# Patient Record
Sex: FEMALE | Race: WHITE
[De-identification: names, ages, dates, MRNs, and addresses within clinical notes are randomized per-mention and may not be internally consistent; named-entity substitution may affect disease eponyms.]

---

## 2019-07-29 ENCOUNTER — HOSPITAL ENCOUNTER (INPATIENT)
Dept: HOSPITAL 7 - FB.OBCHECK | Age: 39
LOS: 1 days | Discharge: HOME | End: 2019-07-30
Attending: FAMILY MEDICINE | Admitting: FAMILY MEDICINE
Payer: COMMERCIAL

## 2019-07-29 DIAGNOSIS — Z79.890: ICD-10-CM

## 2019-07-29 DIAGNOSIS — Z3A.34: ICD-10-CM

## 2019-07-29 DIAGNOSIS — E03.9: ICD-10-CM

## 2019-07-29 NOTE — PCM.LDHP
L&D History of Present Illness





- General


Date of Service: 19


Admit Problem/Dx: 


 Patient Status Order with Admit Dx/Problem





19 21:11


Patient Status [ADT] Routine 








 Admission Diagnosis/Problem











Admission Diagnosis/Problem    Labor established














Source of Information: Patient


History Limitations: Reports: No Limitations





- History of Present Illness


Introduction:: 





spontaneous onset of labor at  this pm accompanied by vaginal bleeding.


Mom 39yo  healthy multip.


Timing/Duration: Reports: minutes: (3)


Location, Pregnancy: Reports: Uterus


Quality: Reports: Ache


Severity: Moderate





- Related Data


Allergies/Adverse Reactions: 


 Allergies











Allergy/AdvReac Type Severity Reaction Status Date / Time


 


No Known Allergies Allergy   Verified 19 11:00














Past Medical History


Cardiovascular History: Reports: Other (See Below)


Other Cardiovascular History: pedsl edema    better in am


OB/GYN History: Reports: Pregnancy


Other OB/BYN History: 


Endocrine/Metabolic History: Reports: Hypothyroidism


Other Hematologic History: spleenectomy in 





Social & Family History





- Family History


Family Medical History: Noncontributory





- Caffeine Use


Caffeine Use: Reports: Coffee





H&P Review of Systems





- Review of Systems:


Review Of Systems: See Below


General: Denies: Fever, Chills


HEENT: Reports: No Symptoms


Pulmonary: Denies: Shortness of Breath


Cardiovascular: Denies: Chest Pain, Edema


Gastrointestinal: Denies: Diarrhea


Genitourinary: Denies: Burning, Hematuria


Musculoskeletal: Reports: No Symptoms


Skin: Reports: No Symptoms


Psychiatric: Reports: Anxiety


Neurological: Reports: No Symptoms





L&D Exam





- Exam


Exam: See Below





- OB Specific


Contraction Duration (sec): 90


Contraction Frequency (min): 3


Contraction Intensity: Moderate to Strong


Fetal Movement: Not Appreciated


Fetal Heart Tones: Present


Fetal Heart Rate (FHR) Variability: Moderate (6-25 bmp)


Birth Presentation: Vertex





- Valera Score


Valera Score Cervix Position: Anterior


Valera Score Effacement: >80%


Valera Score Dilation: > 5 cm


Valera Score Infant's Station: +1, +2





- Exam


General: Alert, Oriented


Neck: Supple


Lungs: Clear to Auscultation


Cardiovascular: Regular Rate, Regular Rhythm


GI/Abdominal Exam: Soft, Non-Tender


Genitourinary: Cervical dilitation (7cm)


Extremities: No: Pedal Edema


Skin: No: Rash


Psychiatric: Anxious





- Problem List


(1)  delivery


SNOMED Code(s): 042280768, 464720641


   ICD Code: O60.10X0 -  LABOR W  DELIVERY, UNSP TRIMESTER, UNSP 

  Status: Acute   Current Visit: Yes   


Problem List Initiated/Reviewed/Updated: Yes


Orders Last 24hrs: 


 Active Orders 24 hr











 Category Date Time Status


 


 Patient Status [ADT] Routine ADT  19 21:11 Active


 


 Communication Order [RC] ASDIRECTED Care  19 21:11 Active


 


 Fetal Heart Tones [RC] PER UNIT ROUTINE Care  19 21:11 Active


 


 Fetal Non Stress Test [RC] Click to Edit Care  19 21:11 Active


 


 Notify Provider Vital Signs [RC] PRN Care  19 21:08 Active


 


 Notify Provider [RC] PRN Care  19 21:11 Active


 


 Vital Signs [RC] PER UNIT ROUTINE Care  19 21:11 Active


 


 Lactated Ringers [Ringers, Lactated] 1,000 ml Med  19 21:30 Active





 IV ASDIRECTED   


 


 Sodium Chloride 0.9% [Saline Flush] Med  19 21:08 Active





 10 ml FLUSH ASDIRECTED PRN   


 


 Saline Lock Insert [OM.PC] Routine Oth  19 21:11 Ordered


 


 Resuscitation Status Routine Resus Stat  19 21:08 Ordered








 Medication Orders





Lactated Ringer's (Ringers, Lactated)  1,000 mls @ 150 mls/hr IV ASDIRECTED GISELE


Sodium Chloride (Saline Flush)  10 ml FLUSH ASDIRECTED PRN


   PRN Reason: Keep Vein Open

## 2019-07-30 NOTE — DISCH
DISCHARGE DATE:  2019

 

PRIMARY FINAL DIAGNOSIS:   labor with  delivery.

 

OPERATION:  Normal vaginal delivery.

 

COMPLICATIONS:  None.

 

SUMMARY:  Marjorie is a 38-year-old,  4, para 3 woman who came in at 32

weeks and 5 days gestation with spontaneous onset of labor and vaginal bleeding.

On arrival, she was 4 cm.  She progressed rapidly to 7 cm and then to complete

precipitous delivery.

 

She delivered a 4 pounds 15 ounce male infant in the labor bed.  Baby required

resuscitation and was transported to the NICU in [place] per NICU Team in stable

condition.

 

Mom's postpartum course was normal with a postpartum hemoglobin still pending.

 

She is discharged to home to continue medications as follows:  Prenatal vitamins

1 daily, vitamin C 500 mg daily, and levothyroxine 200 mcg daily.

 

She is to have follow up with Dr. Fontanez in 6 weeks and come in with her baby

when he returns for a  check.

 

Job#: [id number]

DD: 2019 [id number]

DT: 2019 1935 KATI/BRIAN

## 2019-07-30 NOTE — DEL
DATE OF DELIVERY:  2019

 

Ms. Brewster is a 38-year-old,  4, para 3 woman at 34 weeks gestation, who

came in with spontaneous labor.  On admission, she was having contractions with

bleeding.  She was dilated to 4 cm.  She rapidly progressed to complete and had

a precipitous delivery of a male infant.  Baby was delivered onto the bed with

the nurse present.  I was there within 30 seconds of delivery.  Baby initially

had no respiratory effort, had heart rate of approximately 80, and mild mottling

of the extremities with decreased tone obvious.  Nares were suctioned and the

baby was given several breaths of mouth to face respirations.  Bag-mask was then

acquired and baby bagged with room air, it was then hooked up to oxygen and baby

brought to the [place] warmer and was continued additional suctioning and

aspiration of stomach contents was accomplished.  Bagging continued.  Baby

pinked up nicely.  Heart rate went up into the 130s after bagging and color

improved.  Baby was dried and taken to the nursery, placed on a fresh [place]

warmer.  In the meantime, mom had been attended to, she delivered spontaneously,

delivered Schultze placenta with a three-vessel cord.  She was given two

aliquots of 10 mg Pitocin IM.  With additional fundal massage, her uterus

contracted down to nice firm uterus.  Perineal inspection revealed no apparent

lacerations, and her IV was run at 999 mL per hour.  Baby will be sent to [facility]

via the  transport team and mom will likely be discharged in the morning.

Baby's birth weight 4 pounds 15.5 ounces.  Apgar score of 4, 7, and 7.

 

Job#: [id number]

DD: 2019 [id number]

DT: 2019 1455 KATI/BRIAN

## 2023-04-06 ENCOUNTER — HOSPITAL ENCOUNTER (INPATIENT)
Dept: HOSPITAL 7 - FB.ED | Age: 43
LOS: 9 days | Discharge: HOME | DRG: 361 | End: 2023-04-15
Attending: FAMILY MEDICINE | Admitting: EMERGENCY MEDICINE
Payer: COMMERCIAL

## 2023-04-06 DIAGNOSIS — Z79.890: ICD-10-CM

## 2023-04-06 DIAGNOSIS — L03.115: Primary | ICD-10-CM

## 2023-04-06 DIAGNOSIS — Z79.899: ICD-10-CM

## 2023-04-06 DIAGNOSIS — D50.9: ICD-10-CM

## 2023-04-06 DIAGNOSIS — E87.6: ICD-10-CM

## 2023-04-06 DIAGNOSIS — E03.9: ICD-10-CM

## 2023-04-06 DIAGNOSIS — Z90.81: ICD-10-CM

## 2023-04-06 DIAGNOSIS — Z90.49: ICD-10-CM

## 2023-04-06 RX ADMIN — Medication PRN ML: at 17:55

## 2023-04-06 RX ADMIN — Medication PRN ML: at 18:15

## 2023-04-06 RX ADMIN — KETOROLAC TROMETHAMINE ONE: 30 INJECTION, SOLUTION INTRAMUSCULAR at 18:32

## 2023-04-06 RX ADMIN — KETOROLAC TROMETHAMINE ONE MG: 30 INJECTION, SOLUTION INTRAMUSCULAR at 18:14

## 2023-04-07 RX ADMIN — Medication PRN ML: at 06:45

## 2023-04-07 RX ADMIN — Medication PRN ML: at 00:35

## 2023-04-08 LAB
IRON SATN MFR SERPL: 2 % (ref 15–55)
IRON, SERUM: 5 UG/DL (ref 27–159)
TIBC SERPL-MCNC: 309 UG/DL (ref 250–450)
UIBC SERPL-MCNC: 304 UG/DL (ref 131–425)

## 2023-04-08 RX ADMIN — Medication PRN ML: at 13:14

## 2023-04-08 RX ADMIN — Medication PRN ML: at 11:38

## 2023-04-08 RX ADMIN — Medication PRN ML: at 20:58

## 2023-04-08 RX ADMIN — Medication PRN ML: at 13:16

## 2023-04-09 RX ADMIN — Medication SCH EACH: at 11:37

## 2023-04-09 RX ADMIN — Medication PRN ML: at 21:03

## 2023-04-09 RX ADMIN — Medication PRN ML: at 05:01

## 2023-04-09 RX ADMIN — Medication PRN ML: at 11:42

## 2023-04-10 RX ADMIN — Medication PRN ML: at 22:43

## 2023-04-10 RX ADMIN — Medication SCH MG: at 12:23

## 2023-04-10 RX ADMIN — Medication PRN ML: at 22:49

## 2023-04-10 RX ADMIN — Medication PRN ML: at 04:36

## 2023-04-10 RX ADMIN — Medication PRN ML: at 12:31

## 2023-04-10 RX ADMIN — Medication SCH EACH: at 11:49

## 2023-04-10 RX ADMIN — Medication SCH MG: at 20:17

## 2023-04-11 PROCEDURE — 0HBKXZZ EXCISION OF RIGHT LOWER LEG SKIN, EXTERNAL APPROACH: ICD-10-PCS

## 2023-04-11 RX ADMIN — Medication SCH MG: at 20:36

## 2023-04-11 RX ADMIN — Medication SCH MG: at 09:09

## 2023-04-11 RX ADMIN — Medication PRN ML: at 12:53

## 2023-04-11 RX ADMIN — Medication PRN ML: at 22:45

## 2023-04-11 RX ADMIN — Medication PRN ML: at 11:05

## 2023-04-11 RX ADMIN — Medication PRN ML: at 20:32

## 2023-04-11 RX ADMIN — Medication PRN ML: at 04:04

## 2023-04-11 RX ADMIN — Medication SCH EACH: at 11:30

## 2023-04-12 RX ADMIN — Medication PRN ML: at 11:19

## 2023-04-12 RX ADMIN — Medication PRN ML: at 11:15

## 2023-04-12 RX ADMIN — Medication SCH EACH: at 11:13

## 2023-04-12 RX ADMIN — Medication PRN ML: at 22:14

## 2023-04-12 RX ADMIN — Medication SCH MG: at 09:02

## 2023-04-12 RX ADMIN — Medication PRN ML: at 20:33

## 2023-04-12 RX ADMIN — Medication SCH MG: at 20:29

## 2023-04-12 RX ADMIN — Medication PRN ML: at 03:47

## 2023-04-13 RX ADMIN — Medication PRN ML: at 22:47

## 2023-04-13 RX ADMIN — Medication PRN ML: at 03:59

## 2023-04-13 RX ADMIN — Medication SCH EACH: at 11:09

## 2023-04-13 RX ADMIN — Medication SCH MG: at 20:50

## 2023-04-13 RX ADMIN — Medication PRN ML: at 11:09

## 2023-04-13 RX ADMIN — Medication SCH MG: at 08:37

## 2023-04-14 RX ADMIN — Medication SCH MG: at 09:19

## 2023-04-14 RX ADMIN — Medication SCH EACH: at 12:46

## 2023-04-14 RX ADMIN — Medication PRN ML: at 22:07

## 2023-04-14 RX ADMIN — Medication PRN ML: at 22:16

## 2023-04-14 RX ADMIN — Medication SCH MG: at 20:24

## 2023-04-15 RX ADMIN — Medication SCH MG: at 09:11

## 2023-04-15 RX ADMIN — Medication SCH EACH: at 13:09
